# Patient Record
Sex: MALE | Race: WHITE | NOT HISPANIC OR LATINO
[De-identification: names, ages, dates, MRNs, and addresses within clinical notes are randomized per-mention and may not be internally consistent; named-entity substitution may affect disease eponyms.]

---

## 2023-02-08 PROBLEM — Z00.00 ENCOUNTER FOR PREVENTIVE HEALTH EXAMINATION: Status: ACTIVE | Noted: 2023-02-08

## 2023-02-13 ENCOUNTER — APPOINTMENT (OUTPATIENT)
Dept: ORTHOPEDIC SURGERY | Facility: CLINIC | Age: 48
End: 2023-02-13
Payer: OTHER MISCELLANEOUS

## 2023-02-13 VITALS — BODY MASS INDEX: 29.8 KG/M2 | HEIGHT: 72 IN | WEIGHT: 220 LBS

## 2023-02-13 DIAGNOSIS — I10 ESSENTIAL (PRIMARY) HYPERTENSION: ICD-10-CM

## 2023-02-13 PROCEDURE — 99203 OFFICE O/P NEW LOW 30 MIN: CPT

## 2023-02-13 PROCEDURE — 99072 ADDL SUPL MATRL&STAF TM PHE: CPT

## 2023-02-13 RX ORDER — MELOXICAM 15 MG/1
15 TABLET ORAL
Qty: 30 | Refills: 3 | Status: ACTIVE | COMMUNITY
Start: 2023-02-13 | End: 1900-01-01

## 2023-02-13 NOTE — HISTORY OF PRESENT ILLNESS
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

## 2023-02-13 NOTE — PHYSICAL EXAM
[de-identified] : Bilateral hands\par CMC tender\par Swelling\par Finger stiffness\par \par \par

## 2023-03-13 ENCOUNTER — APPOINTMENT (OUTPATIENT)
Dept: ORTHOPEDIC SURGERY | Facility: CLINIC | Age: 48
End: 2023-03-13
Payer: OTHER MISCELLANEOUS

## 2023-03-13 VITALS — HEIGHT: 72 IN | WEIGHT: 220 LBS | BODY MASS INDEX: 29.8 KG/M2

## 2023-03-13 PROCEDURE — 99072 ADDL SUPL MATRL&STAF TM PHE: CPT

## 2023-03-13 PROCEDURE — 99213 OFFICE O/P EST LOW 20 MIN: CPT

## 2023-03-13 RX ORDER — MELOXICAM 15 MG/1
15 TABLET ORAL
Qty: 30 | Refills: 1 | Status: ACTIVE | COMMUNITY
Start: 2023-03-13 | End: 1900-01-01

## 2023-03-13 NOTE — HISTORY OF PRESENT ILLNESS
[6] : 6 [Tingling] : tingling [Constant] : constant [Full time] : Work status: full time [de-identified] : Bilateral hand swelling and pain [] : Post Surgical Visit: no [FreeTextEntry1] : Otto hands [FreeTextEntry5] : complains of numbness and tingling

## 2023-03-13 NOTE — PHYSICAL EXAM
[de-identified] : Bilateral hands\par CMC tender\par Swelling\par Finger and thumb stiffness\par \par \par

## 2023-04-17 ENCOUNTER — APPOINTMENT (OUTPATIENT)
Dept: ORTHOPEDIC SURGERY | Facility: CLINIC | Age: 48
End: 2023-04-17
Payer: OTHER MISCELLANEOUS

## 2023-04-17 VITALS — HEIGHT: 72 IN | WEIGHT: 220 LBS | BODY MASS INDEX: 29.8 KG/M2

## 2023-04-17 PROCEDURE — 99213 OFFICE O/P EST LOW 20 MIN: CPT

## 2023-04-17 NOTE — PHYSICAL EXAM
[de-identified] : Bilateral hands\par CMC tender\par Swelling\par Finger and thumb stiffness\par \par \par

## 2023-04-17 NOTE — HISTORY OF PRESENT ILLNESS
[7] : 7 [6] : 6 [Tingling] : tingling [Constant] : constant [Full time] : Work status: full time [Work related] : work related [de-identified] : Bilateral hand pain, swelling, stiffness, numbness [] : Post Surgical Visit: no [FreeTextEntry1] : B/L Hands [FreeTextEntry5] : Pt is here for follow up of bilateral hands. States pain is the same since the last visit. Has numbness in hands.  [FreeTextEntry6] : numbness [de-identified] : NSAIDs, ice

## 2023-05-15 ENCOUNTER — APPOINTMENT (OUTPATIENT)
Dept: ORTHOPEDIC SURGERY | Facility: CLINIC | Age: 48
End: 2023-05-15
Payer: OTHER MISCELLANEOUS

## 2023-05-15 VITALS — WEIGHT: 220 LBS | HEIGHT: 72 IN | BODY MASS INDEX: 29.8 KG/M2

## 2023-05-15 PROCEDURE — 99213 OFFICE O/P EST LOW 20 MIN: CPT

## 2023-05-15 NOTE — PHYSICAL EXAM
[de-identified] : Bilateral hands\par CMC tender\par Swelling\par Finger and thumb stiffness\par \par \par

## 2023-05-15 NOTE — HISTORY OF PRESENT ILLNESS
[Work related] : work related [6] : 6 [5] : 5 [Dull/Aching] : dull/aching [Full time] : Work status: full time [de-identified] : Bilateral CMC sprain \par Has pain  [FreeTextEntry1] : hands [de-identified] : ice,meds

## 2023-07-17 ENCOUNTER — APPOINTMENT (OUTPATIENT)
Dept: ORTHOPEDIC SURGERY | Facility: CLINIC | Age: 48
End: 2023-07-17
Payer: OTHER MISCELLANEOUS

## 2023-07-17 VITALS — HEIGHT: 72 IN | WEIGHT: 220 LBS | BODY MASS INDEX: 29.8 KG/M2

## 2023-07-17 PROCEDURE — 99213 OFFICE O/P EST LOW 20 MIN: CPT

## 2023-07-17 NOTE — PHYSICAL EXAM
[de-identified] : Bilateral hands\par CMC tender\par Swelling\par Finger and thumb stiffness\par Numbness in the hand/wrist

## 2023-07-17 NOTE — HISTORY OF PRESENT ILLNESS
[Work related] : work related [Dull/Aching] : dull/aching [Full time] : Work status: full time [5] : 5 [3] : 3 [] : yes [de-identified] : Bilateral CMC pain  [FreeTextEntry1] : hands [FreeTextEntry3] : 2/3/2023 [de-identified] : ice

## 2023-07-19 ENCOUNTER — APPOINTMENT (OUTPATIENT)
Dept: NEUROLOGY | Facility: CLINIC | Age: 48
End: 2023-07-19

## 2023-07-31 ENCOUNTER — APPOINTMENT (OUTPATIENT)
Dept: ORTHOPEDIC SURGERY | Facility: CLINIC | Age: 48
End: 2023-07-31

## 2023-08-10 ENCOUNTER — APPOINTMENT (OUTPATIENT)
Dept: NEUROLOGY | Facility: CLINIC | Age: 48
End: 2023-08-10
Payer: OTHER MISCELLANEOUS

## 2023-08-10 PROCEDURE — 95912 NRV CNDJ TEST 11-12 STUDIES: CPT

## 2023-08-10 PROCEDURE — 95886 MUSC TEST DONE W/N TEST COMP: CPT | Mod: 26

## 2023-08-21 ENCOUNTER — APPOINTMENT (OUTPATIENT)
Dept: ORTHOPEDIC SURGERY | Facility: CLINIC | Age: 48
End: 2023-08-21
Payer: OTHER MISCELLANEOUS

## 2023-08-21 VITALS — HEIGHT: 72 IN | WEIGHT: 220 LBS | BODY MASS INDEX: 29.8 KG/M2

## 2023-08-21 PROCEDURE — 99213 OFFICE O/P EST LOW 20 MIN: CPT

## 2023-08-21 NOTE — HISTORY OF PRESENT ILLNESS
[Work related] : work related [5] : 5 [Dull/Aching] : dull/aching [Tingling] : tingling [Full time] : Work status: full time [de-identified] : EMG shows mild R CTS   [FreeTextEntry1] : hands

## 2023-08-21 NOTE — PHYSICAL EXAM
[de-identified] : Bilateral hands CMC tender Swelling Finger and thumb stiffness Numbness in the hand/wrist  +tinels, comp

## 2023-10-02 ENCOUNTER — APPOINTMENT (OUTPATIENT)
Dept: ORTHOPEDIC SURGERY | Facility: CLINIC | Age: 48
End: 2023-10-02

## 2023-10-09 ENCOUNTER — APPOINTMENT (OUTPATIENT)
Dept: ORTHOPEDIC SURGERY | Facility: CLINIC | Age: 48
End: 2023-10-09

## 2023-10-23 ENCOUNTER — APPOINTMENT (OUTPATIENT)
Dept: ORTHOPEDIC SURGERY | Facility: CLINIC | Age: 48
End: 2023-10-23
Payer: OTHER MISCELLANEOUS

## 2023-10-23 VITALS — HEIGHT: 72 IN | BODY MASS INDEX: 29.8 KG/M2 | WEIGHT: 220 LBS

## 2023-10-23 DIAGNOSIS — R20.0 ANESTHESIA OF SKIN: ICD-10-CM

## 2023-10-23 PROCEDURE — 99213 OFFICE O/P EST LOW 20 MIN: CPT

## 2023-12-18 ENCOUNTER — APPOINTMENT (OUTPATIENT)
Dept: ORTHOPEDIC SURGERY | Facility: CLINIC | Age: 48
End: 2023-12-18
Payer: OTHER MISCELLANEOUS

## 2023-12-18 VITALS — BODY MASS INDEX: 29.8 KG/M2 | WEIGHT: 220 LBS | HEIGHT: 72 IN

## 2023-12-18 DIAGNOSIS — S63.8X2A SPRAIN OF OTHER PART OF LEFT WRIST AND HAND, INITIAL ENCOUNTER: ICD-10-CM

## 2023-12-18 DIAGNOSIS — G56.01 CARPAL TUNNEL SYNDROME, RIGHT UPPER LIMB: ICD-10-CM

## 2023-12-18 DIAGNOSIS — S63.8X1A SPRAIN OF OTHER PART OF RIGHT WRIST AND HAND, INITIAL ENCOUNTER: ICD-10-CM

## 2023-12-18 DIAGNOSIS — M77.8 OTHER ENTHESOPATHIES, NOT ELSEWHERE CLASSIFIED: ICD-10-CM

## 2023-12-18 PROCEDURE — 99455 WORK RELATED DISABILITY EXAM: CPT

## 2023-12-18 NOTE — PHYSICAL EXAM
[de-identified] : Bilateral hands CMC tender Swelling Finger and thumb stiffness Numbness in the hand/wrist  +tinels, comp Weakness in the hands with  R wrist ROM 60/60 L wrist ROM 50/50

## 2023-12-18 NOTE — ASSESSMENT
[FreeTextEntry1] : 30% loss R hand (CTS plus loss of ROM) 20% loss L hand  (loss of ROM)  Return prn He will likely need CTR in the future

## 2023-12-18 NOTE — HISTORY OF PRESENT ILLNESS
[Work related] : work related [5] : 5 [Dull/Aching] : dull/aching [Tingling] : tingling [Full time] : Work status: full time [] : yes [de-identified] : Bilateral hands CTS, OA, tendonitis Numbness, swelling, stiffness,  He has reached MMI [FreeTextEntry1] : hands [de-identified] : ice, HEP

## 2024-07-03 ENCOUNTER — APPOINTMENT (OUTPATIENT)
Dept: ORTHOPEDIC SURGERY | Facility: CLINIC | Age: 49
End: 2024-07-03
Payer: OTHER MISCELLANEOUS

## 2024-07-03 VITALS — HEIGHT: 71 IN | WEIGHT: 220 LBS | BODY MASS INDEX: 30.8 KG/M2

## 2024-07-03 DIAGNOSIS — M75.42 IMPINGEMENT SYNDROME OF LEFT SHOULDER: ICD-10-CM

## 2024-07-03 DIAGNOSIS — S43.432A SUPERIOR GLENOID LABRUM LESION OF LEFT SHOULDER, INITIAL ENCOUNTER: ICD-10-CM

## 2024-07-03 PROCEDURE — 73010 X-RAY EXAM OF SHOULDER BLADE: CPT | Mod: LT

## 2024-07-03 PROCEDURE — 99204 OFFICE O/P NEW MOD 45 MIN: CPT

## 2024-07-03 PROCEDURE — 73030 X-RAY EXAM OF SHOULDER: CPT | Mod: LT

## 2024-07-03 RX ORDER — NAPROXEN 500 MG/1
500 TABLET ORAL
Qty: 20 | Refills: 0 | Status: ACTIVE | COMMUNITY
Start: 2024-07-03 | End: 1900-01-01

## 2024-07-12 ENCOUNTER — APPOINTMENT (OUTPATIENT)
Dept: MRI IMAGING | Facility: CLINIC | Age: 49
End: 2024-07-12

## 2024-07-24 ENCOUNTER — APPOINTMENT (OUTPATIENT)
Dept: ORTHOPEDIC SURGERY | Facility: CLINIC | Age: 49
End: 2024-07-24

## 2024-09-04 ENCOUNTER — APPOINTMENT (OUTPATIENT)
Dept: ORTHOPEDIC SURGERY | Facility: CLINIC | Age: 49
End: 2024-09-04
Payer: OTHER MISCELLANEOUS

## 2024-09-04 DIAGNOSIS — S46.012A STRAIN OF MUSCLE(S) AND TENDON(S) OF THE ROTATOR CUFF OF LEFT SHOULDER, INITIAL ENCOUNTER: ICD-10-CM

## 2024-09-04 PROCEDURE — J3490M: CUSTOM

## 2024-09-04 PROCEDURE — 20610 DRAIN/INJ JOINT/BURSA W/O US: CPT | Mod: LT

## 2024-09-04 PROCEDURE — 99214 OFFICE O/P EST MOD 30 MIN: CPT | Mod: 25

## 2024-09-04 NOTE — HISTORY OF PRESENT ILLNESS
[Dull/Aching] : dull/aching [Localized] : localized [Constant] : constant [Household chores] : household chores [Leisure] : leisure [Nothing helps with pain getting better] : Nothing helps with pain getting better

## 2024-10-16 ENCOUNTER — APPOINTMENT (OUTPATIENT)
Dept: ORTHOPEDIC SURGERY | Facility: CLINIC | Age: 49
End: 2024-10-16
Payer: OTHER MISCELLANEOUS

## 2024-10-16 DIAGNOSIS — S46.012A STRAIN OF MUSCLE(S) AND TENDON(S) OF THE ROTATOR CUFF OF LEFT SHOULDER, INITIAL ENCOUNTER: ICD-10-CM

## 2024-10-16 DIAGNOSIS — M75.42 IMPINGEMENT SYNDROME OF LEFT SHOULDER: ICD-10-CM

## 2024-10-16 DIAGNOSIS — S43.432A SUPERIOR GLENOID LABRUM LESION OF LEFT SHOULDER, INITIAL ENCOUNTER: ICD-10-CM

## 2024-10-16 PROCEDURE — 99213 OFFICE O/P EST LOW 20 MIN: CPT

## 2024-12-04 ENCOUNTER — APPOINTMENT (OUTPATIENT)
Dept: ORTHOPEDIC SURGERY | Facility: CLINIC | Age: 49
End: 2024-12-04
Payer: OTHER MISCELLANEOUS

## 2024-12-04 DIAGNOSIS — S43.432A SUPERIOR GLENOID LABRUM LESION OF LEFT SHOULDER, INITIAL ENCOUNTER: ICD-10-CM

## 2024-12-04 DIAGNOSIS — S46.012A STRAIN OF MUSCLE(S) AND TENDON(S) OF THE ROTATOR CUFF OF LEFT SHOULDER, INITIAL ENCOUNTER: ICD-10-CM

## 2024-12-04 DIAGNOSIS — M75.42 IMPINGEMENT SYNDROME OF LEFT SHOULDER: ICD-10-CM

## 2024-12-04 PROCEDURE — 99213 OFFICE O/P EST LOW 20 MIN: CPT

## 2025-02-12 ENCOUNTER — APPOINTMENT (OUTPATIENT)
Dept: ORTHOPEDIC SURGERY | Facility: CLINIC | Age: 50
End: 2025-02-12
Payer: OTHER MISCELLANEOUS

## 2025-02-12 DIAGNOSIS — S46.012A STRAIN OF MUSCLE(S) AND TENDON(S) OF THE ROTATOR CUFF OF LEFT SHOULDER, INITIAL ENCOUNTER: ICD-10-CM

## 2025-02-12 PROCEDURE — 99213 OFFICE O/P EST LOW 20 MIN: CPT

## 2025-02-24 ENCOUNTER — RESULT REVIEW (OUTPATIENT)
Age: 50
End: 2025-02-24

## 2025-03-12 ENCOUNTER — APPOINTMENT (OUTPATIENT)
Dept: ORTHOPEDIC SURGERY | Facility: CLINIC | Age: 50
End: 2025-03-12
Payer: OTHER MISCELLANEOUS

## 2025-03-12 DIAGNOSIS — S46.012A STRAIN OF MUSCLE(S) AND TENDON(S) OF THE ROTATOR CUFF OF LEFT SHOULDER, INITIAL ENCOUNTER: ICD-10-CM

## 2025-03-12 PROCEDURE — 99214 OFFICE O/P EST MOD 30 MIN: CPT | Mod: 25

## 2025-03-12 PROCEDURE — J3490M: CUSTOM

## 2025-03-12 PROCEDURE — 20610 DRAIN/INJ JOINT/BURSA W/O US: CPT | Mod: LT

## 2025-06-11 ENCOUNTER — APPOINTMENT (OUTPATIENT)
Dept: ORTHOPEDIC SURGERY | Facility: CLINIC | Age: 50
End: 2025-06-11
Payer: OTHER MISCELLANEOUS

## 2025-06-11 PROCEDURE — 99213 OFFICE O/P EST LOW 20 MIN: CPT

## 2025-09-10 ENCOUNTER — APPOINTMENT (OUTPATIENT)
Dept: ORTHOPEDIC SURGERY | Facility: CLINIC | Age: 50
End: 2025-09-10
Payer: OTHER MISCELLANEOUS

## 2025-09-10 PROCEDURE — 99213 OFFICE O/P EST LOW 20 MIN: CPT

## 2025-09-17 ENCOUNTER — APPOINTMENT (OUTPATIENT)
Dept: ORTHOPEDIC SURGERY | Facility: CLINIC | Age: 50
End: 2025-09-17
Payer: OTHER MISCELLANEOUS

## 2025-09-17 DIAGNOSIS — S46.012A STRAIN OF MUSCLE(S) AND TENDON(S) OF THE ROTATOR CUFF OF LEFT SHOULDER, INITIAL ENCOUNTER: ICD-10-CM

## 2025-09-17 PROCEDURE — 99455 WORK RELATED DISABILITY EXAM: CPT
